# Patient Record
(demographics unavailable — no encounter records)

---

## 2025-05-08 NOTE — ASSESSMENT
[FreeTextEntry1] : Will obtain STAT MRI L elbow to r/o occult fx and stress fx Continue with ice, elevation, and NSAIDs prn Defers sling today No gym/sports until follow up f/u with Dr. Rojas after MRI  The patient was advised of the diagnosis.  The natural history of the pathology was explained in full to the patient in layman's terms. All questions were answered.  The risks and benefits of surgical and non-surgical treatment alternatives were explained in full to the patient.   The patient was seen by me, Vanesa Begum PA-C, during [facility] hours. We discussed the overall plan of care, and I recommended that he follow up with one of our orthopedic physicians within the next 7 days following this appointment. He is well-informed and expresses understanding of this plan.

## 2025-05-08 NOTE — PHYSICAL EXAM
[NL (150)] : flexion 150 degrees [NL (0)] : extension 0 degrees [NL (90)] : supination 90 degrees [Left] : left elbow [There are no fractures, subluxations or dislocations. No significant abnormalities are seen] : There are no fractures, subluxations or dislocations. No significant abnormalities are seen [] : no erythema [de-identified] : Guarding with exam

## 2025-05-08 NOTE — HISTORY OF PRESENT ILLNESS
[Rest] : rest [Student] : Work status: student [de-identified] : 12yo RHD M with left elbow pain for the past 2 days with no injury. Mother states he awoke with the pain. No formal tx to date. Denies prior elbow issues.  Active with baseball and basketball.  [] : no [de-identified] : elbow movement

## 2025-05-09 NOTE — DISCUSSION/SUMMARY
[de-identified] : 11m with MRI of the let elbow showing stress reaction, contusion/ sere avulsion injury to the olecranon without triceps tendon tear. No ligament tear.   Spoke with Urgent Care Provider, reviewed notes and images.  The MRI results and images were reviewed with the patient.  The patient was advised of the diagnosis. The natural history of the pathology was explained in full to the patient in layman's terms. All questions were answered.  1) out of gym and sports 2) cryotherapy, rest and activity modification  3) rtc 1 week   Entered by Ria Caputo acting as scribe. Dr. Poe- The documentation recorded by the scribe accurately reflects the service I personally performed and the decisions made by me.

## 2025-05-09 NOTE — DATA REVIEWED
[MRI] : MRI [Left] : left [Knee] : knee [Report was reviewed and noted in the chart] : The report was reviewed and noted in the chart [I independently reviewed and interpreted images and report] : I independently reviewed and interpreted images and report [I reviewed the films/CD] : I reviewed the films/CD [FreeTextEntry1] : 5/8/25 [facility] - independent interpretation on 05/09/2025: stress reaction, contusion/ sere avulsion injury to the olecranon without triceps tendon tear. No ligament tear

## 2025-05-09 NOTE — PHYSICAL EXAM
[NL (150)] : flexion 150 degrees [NL (90)] : supination 90 degrees [] : stability with posterior stress [FreeTextEntry9] : hyperextension

## 2025-05-09 NOTE — HISTORY OF PRESENT ILLNESS
[de-identified] : 05/09/2025 Mr. NICOLE PRINCE, a RHD 11 year old male (RHD, [employer] - baseball, basketball), presents today for Left elbow pain and to review MRI results done at [facility] 05/08/25. Pt states pain began 2 days ago with insidious onset. denies hearing/feeling pop/crack. PT describes pain as throbbing located around medial and lateral aspect of elbow. Pain worsens with full extension.  Denies n/t or pain radiating throughout the UE. Pt saw [facility] yesterday, 05/09/25 for XRAYs and MRI.

## 2025-05-15 NOTE — PHYSICAL EXAM
[NL (150)] : flexion 150 degrees [NL (90)] : supination 90 degrees [Left] : left elbow [] : no pain with valgus stress to elbow [FreeTextEntry9] : hyperextension

## 2025-05-15 NOTE — DISCUSSION/SUMMARY
[de-identified] : 11m with MRI of the left elbow showing stress reaction, contusion/ sere avulsion injury to the olecranon without triceps tendon tear. No ligament tear.  The patient was advised of the diagnosis. The natural history of the pathology was explained in full to the patient in layman's terms. All questions were answered.  Has been gradually improving.   1) clear to return to gym and sports - must wear sleeve and arm guard for sports 2) cryotherapy, rest and activity modification  3) rtc 1 week, will discuss full clearance   Entered by Ria Caputo acting as scribe. Dr. Poe- The documentation recorded by the scribe accurately reflects the service I personally performed and the decisions made by me.

## 2025-05-15 NOTE — HISTORY OF PRESENT ILLNESS
[de-identified] : 05/15/25: Pt here to f/u for Left Elbow pain. Pt states he has no pain and feels completely fine. Pt currently participating in gym. Pt states he has been playing wiffleball with friends and reports no pain.    05/09/2025 Mr. NICOLE PRINCE, a RHD 11 year old male (RHD, [employer] - baseball, basketball), presents today for Left elbow pain and to review MRI results done at [facility] 05/08/25. Pt states pain began 2 days ago with insidious onset. denies hearing/feeling pop/crack. PT describes pain as throbbing located around medial and lateral aspect of elbow. Pain worsens with full extension.  Denies n/t or pain radiating throughout the UE. Pt saw [facility] yesterday, 05/09/25 for XRAYs and MRI.